# Patient Record
Sex: FEMALE | Race: NATIVE HAWAIIAN OR OTHER PACIFIC ISLANDER
[De-identification: names, ages, dates, MRNs, and addresses within clinical notes are randomized per-mention and may not be internally consistent; named-entity substitution may affect disease eponyms.]

---

## 2018-08-02 ENCOUNTER — HOSPITAL ENCOUNTER (EMERGENCY)
Dept: HOSPITAL 5 - ED | Age: 23
LOS: 1 days | Discharge: HOME | End: 2018-08-03
Payer: COMMERCIAL

## 2018-08-02 DIAGNOSIS — Z87.891: ICD-10-CM

## 2018-08-02 DIAGNOSIS — N92.0: ICD-10-CM

## 2018-08-02 DIAGNOSIS — D64.9: Primary | ICD-10-CM

## 2018-08-02 DIAGNOSIS — M25.572: ICD-10-CM

## 2018-08-02 LAB
ALBUMIN SERPL-MCNC: 4.1 G/DL (ref 3.9–5)
ALT SERPL-CCNC: 9 UNITS/L (ref 7–56)
APTT BLD: 22.4 SEC. (ref 24.2–36.6)
BASOPHILS # (AUTO): 0.1 K/MM3 (ref 0–0.1)
BASOPHILS NFR BLD AUTO: 1.3 % (ref 0–1.8)
BUN SERPL-MCNC: 7 MG/DL (ref 7–17)
BUN/CREAT SERPL: 10 %
CALCIUM SERPL-MCNC: 8.5 MG/DL (ref 8.4–10.2)
EOSINOPHIL # BLD AUTO: 0.2 K/MM3 (ref 0–0.4)
EOSINOPHIL NFR BLD AUTO: 2.4 % (ref 0–4.3)
HCT VFR BLD CALC: 14 % (ref 30.3–42.9)
HEMOLYSIS INDEX: 0
HGB BLD-MCNC: 4 GM/DL (ref 10.1–14.3)
INR PPP: 0.87 (ref 0.87–1.13)
LIPASE SERPL-CCNC: 55 UNITS/L (ref 13–60)
LYMPHOCYTES # BLD AUTO: 2.9 K/MM3 (ref 1.2–5.4)
LYMPHOCYTES NFR BLD AUTO: 31.6 % (ref 13.4–35)
MCH RBC QN AUTO: 17 PG (ref 28–32)
MCHC RBC AUTO-ENTMCNC: 29 % (ref 30–34)
MCV RBC AUTO: 58 FL (ref 79–97)
MONOCYTES # (AUTO): 0.7 K/MM3 (ref 0–0.8)
MONOCYTES % (AUTO): 7.3 % (ref 0–7.3)
PLATELET # BLD: 491 K/MM3 (ref 140–440)
RBC # BLD AUTO: 2.42 M/MM3 (ref 3.65–5.03)

## 2018-08-02 PROCEDURE — 80053 COMPREHEN METABOLIC PANEL: CPT

## 2018-08-02 PROCEDURE — 99284 EMERGENCY DEPT VISIT MOD MDM: CPT

## 2018-08-02 PROCEDURE — 86901 BLOOD TYPING SEROLOGIC RH(D): CPT

## 2018-08-02 PROCEDURE — 86850 RBC ANTIBODY SCREEN: CPT

## 2018-08-02 PROCEDURE — 85610 PROTHROMBIN TIME: CPT

## 2018-08-02 PROCEDURE — 83690 ASSAY OF LIPASE: CPT

## 2018-08-02 PROCEDURE — 93005 ELECTROCARDIOGRAM TRACING: CPT

## 2018-08-02 PROCEDURE — 84703 CHORIONIC GONADOTROPIN ASSAY: CPT

## 2018-08-02 PROCEDURE — 85025 COMPLETE CBC W/AUTO DIFF WBC: CPT

## 2018-08-02 PROCEDURE — 93010 ELECTROCARDIOGRAM REPORT: CPT

## 2018-08-02 PROCEDURE — 36415 COLL VENOUS BLD VENIPUNCTURE: CPT

## 2018-08-02 PROCEDURE — 85730 THROMBOPLASTIN TIME PARTIAL: CPT

## 2018-08-02 PROCEDURE — 86900 BLOOD TYPING SEROLOGIC ABO: CPT

## 2018-08-02 NOTE — EMERGENCY DEPARTMENT REPORT
ED General Adult HPI





- General


Chief complaint: Weakness


Stated complaint: HEMOGLOBIN LEVELS


Time Seen by Provider: 08/02/18 22:05


Source: patient


Mode of arrival: Ambulatory


Limitations: No Limitations





- History of Present Illness


Initial comments: 


 Pt went to [place] today for left ankle pain. They did screening lab work when 

she told them that she was experiencing 2 weeks of exertional SOB, dizziness, 

fatigue, generalized weakness, and pale skin.  Her hgb came back at 4. No h/o 

anemia. Pt says that she does have heavy menstrual periods. It is currently 

ending and she just has spotting now. However, she will use up to 5 absorbant 

maxi pads a day during her menstrual cycle. No ibuprofen/motrin/aleve use. 

Denies abd pain or rectal bleeding. Not on ATCs. Currently is on OCPs. 








- Related Data


 Previous Rx's











 Medication  Instructions  Recorded  Last Taken  Type


 


Ferrous Sulfate [Feosol 325 MG tab] 325 mg PO BID #30 tablet 08/02/18 Unknown Rx











 Allergies











Allergy/AdvReac Type Severity Reaction Status Date / Time


 


No Known Allergies Allergy   Verified 08/02/18 22:12














ED Review of Systems


ROS: 


Stated complaint: HEMOGLOBIN LEVELS


Other details as noted in HPI





Comment: All other systems reviewed and negative


Constitutional: malaise, weakness


Genitourinary: abnormal menses


Skin: change in color





ED Past Medical Hx





- Past Medical History


Previous Medical History?: No





- Surgical History


Past Surgical History?: No





- Social History


Smoking Status: Former Smoker


Substance Use Type: None





- Medications


Home Medications: 


 Home Medications











 Medication  Instructions  Recorded  Confirmed  Last Taken  Type


 


Ferrous Sulfate [Feosol 325 MG tab] 325 mg PO BID #30 tablet 08/02/18  Unknown 

Rx














ED Physical Exam





- General


Limitations: No Limitations


General appearance: alert, in no apparent distress





- Head


Head exam: Present: atraumatic, normocephalic





- Eye


Eye exam: Present: normal appearance, other (pale conjunctiva)





- ENT


ENT exam: Present: mucous membranes moist





- Neck


Neck exam: Present: normal inspection





- Respiratory


Respiratory exam: Present: normal lung sounds bilaterally.  Absent: respiratory 

distress





- Cardiovascular


Cardiovascular Exam: Present: regular rate, normal rhythm.  Absent: systolic 

murmur, diastolic murmur, rubs, gallop





- GI/Abdominal


GI/Abdominal exam: Present: soft, normal bowel sounds.  Absent: distended, 

tenderness, guarding, rebound





- Extremities Exam


Extremities exam: Present: normal inspection





- Back Exam


Back exam: Present: normal inspection





- Neurological Exam


Neurological exam: Present: alert, oriented X3





- Psychiatric


Psychiatric exam: Present: normal affect, normal mood





- Skin


Skin exam: Present: warm, dry, intact, pallor.  Absent: rash





ED Course


 Vital Signs











  08/02/18 08/02/18 08/02/18





  20:30 22:25 23:00


 


Temperature 99.2 F 99 F 


 


Pulse Rate 102 H 98 H 


 


Respiratory 18 18 





Rate   


 


Blood Pressure 121/65  107/42


 


Blood Pressure  107/59 





[Left]   


 


O2 Sat by Pulse 100 100 100





Oximetry   














  08/03/18





  00:00


 


Temperature 


 


Pulse Rate 


 


Respiratory 





Rate 


 


Blood Pressure 107/42


 


Blood Pressure 





[Left] 


 


O2 Sat by Pulse 100





Oximetry 














ED Medical Decision Making





- Lab Data


Result diagrams: 


 08/02/18 21:00





 08/02/18 21:00





- EKG Data


-: EKG Interpreted by Me


EKG shows normal: sinus rhythm, axis, intervals, QRS complexes, ST-T waves


Rate: tachycardia





- EKG Data


Interpretation: no acute changes





- Medical Decision Making


 24 yo female with no sig pmhx that p/w generalized symptoms. VS showed HR of 

102, but this improved without intervention. Pt is pale. Hgb is 4. Coags and 

her other cell lines are normal. No h/o rectal bleeding. Likely source is 

menorrhagia. I offered the patient a transfusion and she felt comfortable with 

trying outpatient management. Since pt is stable and this anemia is likely 

chronic, I felt that this was appropriate. I spoke with jailyn who set up a 

follow up appointment in 24 hours. She will be started on oral iron and a stool 

softener. Return precautions have been discussed. Low suspicion for GI bleed or 

coagulopathy. Pt says that she eats meat all the time. 








- Differential Diagnosis


GI bleed, menorrhagia, coagulopathy, malignany, dietary induced


Critical care attestation.: 


If time is entered above; I have spent that time in minutes in the direct care 

of this critically ill patient, excluding procedure time.








ED Disposition


Clinical Impression: 


 Menorrhagia, Anemia





Disposition: DC-01 TO HOME OR SELFCARE


Is pt being admited?: No


Does the pt Need Aspirin: No


Condition: Stable


Instructions:  Iron Deficiency Anemia (ED)


Additional Instructions: 


Start taking your iron supplement twice a day with a stool softener. The stool 

softener can be purchased over the counter. Follow up with the [facility] 

tomorrow at 140 pm with Dr. Gonsalves at the [facility] for further evaluation 

of anemia. If you feel like your symptoms are worsening, please return to the 

ER for re-evaluation. 


Prescriptions: 


Ferrous Sulfate [Feosol 325 MG tab] 325 mg PO BID #30 tablet


Referrals: 


PRIMARY CARE,MD [Primary Care Provider] - 3-5 Days

## 2018-08-03 VITALS — DIASTOLIC BLOOD PRESSURE: 42 MMHG | SYSTOLIC BLOOD PRESSURE: 107 MMHG
